# Patient Record
Sex: FEMALE | HISPANIC OR LATINO | URBAN - METROPOLITAN AREA
[De-identification: names, ages, dates, MRNs, and addresses within clinical notes are randomized per-mention and may not be internally consistent; named-entity substitution may affect disease eponyms.]

---

## 2017-01-12 ENCOUNTER — OFFICE VISIT (OUTPATIENT)
Dept: FAMILY MEDICINE | Facility: CLINIC | Age: 52
End: 2017-01-12

## 2017-01-12 VITALS
HEIGHT: 62 IN | BODY MASS INDEX: 26.46 KG/M2 | OXYGEN SATURATION: 99 % | HEART RATE: 74 BPM | WEIGHT: 143.8 LBS | DIASTOLIC BLOOD PRESSURE: 81 MMHG | SYSTOLIC BLOOD PRESSURE: 118 MMHG

## 2017-01-12 DIAGNOSIS — I10 ESSENTIAL HYPERTENSION WITH GOAL BLOOD PRESSURE LESS THAN 140/90: Primary | ICD-10-CM

## 2017-01-12 DIAGNOSIS — E11.9 TYPE 2 DIABETES MELLITUS WITHOUT COMPLICATION, WITHOUT LONG-TERM CURRENT USE OF INSULIN (H): ICD-10-CM

## 2017-01-12 RX ORDER — SIMVASTATIN 20 MG
20 TABLET ORAL AT BEDTIME
Qty: 30 TABLET | Refills: 4 | Status: SHIPPED | OUTPATIENT
Start: 2017-01-12

## 2017-01-12 RX ORDER — HYDROCHLOROTHIAZIDE 25 MG/1
25 TABLET ORAL DAILY
Qty: 30 TABLET | Refills: 4 | Status: SHIPPED | OUTPATIENT
Start: 2017-01-12

## 2017-01-12 RX ORDER — LOSARTAN POTASSIUM 25 MG/1
25 TABLET ORAL DAILY
Qty: 30 TABLET | Refills: 4 | Status: SHIPPED | OUTPATIENT
Start: 2017-01-12

## 2017-01-12 NOTE — Clinical Note
I have completed my note, please review, add tie in statement and close encounter.   Thanks!   Marco Sheppard Email: [email or web address]

## 2017-01-13 NOTE — PROGRESS NOTES
"[facility] Progress Note    Chief complaint: Medication Refill      Subjective:  Patient comes to the clinic to get her medications refilled.       Current Outpatient Prescriptions   Medication Sig Dispense Refill     aspirin 81 MG EC tablet Take 1 tablet (81 mg) by mouth daily 30 tablet 4     hydrochlorothiazide (HYDRODIURIL) 25 MG tablet Take 1 tablet (25 mg) by mouth daily 30 tablet 4     losartan (COZAAR) 25 MG tablet Take 1 tablet (25 mg) by mouth daily 30 tablet 4     metFORMIN (GLUCOPHAGE) 500 MG tablet Take 2 tablets (1,000 mg) by mouth 2 times daily (with meals) 120 tablet 4     simvastatin (ZOCOR) 20 MG tablet Take 1 tablet (20 mg) by mouth At Bedtime 30 tablet 4     IBUPROFEN PO Take 1 tablet by mouth as needed for moderate pain (Takes for shoulder pain but has not needed in a long time.)       [DISCONTINUED] hydrochlorothiazide (HYDRODIURIL) 25 MG tablet Take 1 tablet (25 mg) by mouth daily 30 tablet 5     [DISCONTINUED] losartan (COZAAR) 25 MG tablet Take 1 tablet (25 mg) by mouth daily 30 tablet 5     [DISCONTINUED] metFORMIN (GLUCOPHAGE) 500 MG tablet Take 2 tablets (1,000 mg) by mouth 2 times daily (with meals) 120 tablet 5     [DISCONTINUED] simvastatin (ZOCOR) 20 MG tablet Take 1 tablet (20 mg) by mouth At Bedtime 30 tablet 5     fish oil-omega-3 fatty acids 1000 MG capsule Take 1 g by mouth daily           Objective:   /81 mmHg  Pulse 74  Ht 5' 1.5\" (156.2 cm)  Wt 143 lb 12.8 oz (65.227 kg)  BMI 26.73 kg/m2  SpO2 99%    Assessment:     Condition 1- Type II Diabetes    Patient is controlling her diabetes very well. She is currently taking [id number] mg twice daily and denies any side effects such as stomach upset. Patient also denies any symptoms of hypoglycemia such as sweating or shacking. According to the ADA guidelines, her goal A1C level is less than 7%.Patient had her lab drawn last month on 12/19/2016 and her A1C level was 5.6%. Patient is at goal with her A1C levels. No " changes will be made and she will continue to take her current regimen.     Condition 2- Hypertension    Patient is currently taking losartan 25mg (1 tablet by mouth daily) and hydrochlorothiazide 25mg (1 tablet by mouth daily) to control her blood pressure. Patient's blood pressure at the time of visit was 118/81 mmHg. According to the JNC8 guidelines and ADA guidelines, the goal blood pressure for a patient with diabetes is < 140/90 mmHg. Her blood pressure at the time of visit is at goal. Patient denies any side effects from losartan and hydrochlorothiazide such as dizziness. No changes will be made and she will continue her current regimen.     Condition 3- Hyperlipidemia/cardiovascular health    Patient is also taking aspirin 81 mg (1 tablet by mouth daily) and simvastatin 20 mg (1 tablet by mouth before bedtime). Patient denies any side effects from aspirin such as easy bleeding or bruising. Patient also denies side effects from the simvastatin such as muscle pain. Her lab values from 12/19/2016 were: HDL 48, LDL 50, Total cholesterol 163. Patient's 10-year ASCVD risk is 3.3%. Per ACC/AHA guidelines, patient should be in moderate-intensity statin, which she is. She will continue with her current medication regimen.     Plan:  1. Continue taking aspirin 81 mg tablet - 1 tablet by mouth daily  2. Continue taking losartan 25 mg tablet - 1 tablet by mouth daily  3. Continue taking hydrochlorothiazide 25 mg tablet - 1 tablet by mouth daily  4. Continue taking metformin 500 mg tablet - take 2 tablets by mouth twice daily  5. Continue taking simvastatin 20 mg tablet - take 1 tablet by mouth before bedtime    Pharmacy Follow-Up Plan (Method, Date, Parameters):     Follow-up by phone within 7 days to assess the safety of her medications. For aspirin, assess whether patient is having nose bleed, if she has bruises on her body. For losartan, assess whether the patient is having dizziness after taking her medication,  difficulty breathing, or experiencing swelling of face or lips (Angioedema). For hydrochlorothiazide, assess whether the patient is having dizziness. For metformin, assess whether patient is experiencing any stomach upset after taking the medication. For simvastatin, ask whether she is experiencing any muscle pain.       PharmCare Clinician: Ellie Sheppard   Pharmacy Preceptor: Joaquín Myers    _____________________________  Preceptor Use Only:  In supervising the student, I have reviewed and verified the student's documentation and found it to be correct and complete.   Preceptor Signature: Joaquín Myers PharmD

## 2017-01-13 NOTE — PROGRESS NOTES
HPI      ROS      Physical Exam    CHW- 1/12/17 Patient has no need for CHW services tonight. ADH/BM

## 2017-01-13 NOTE — NURSING NOTE
Patient only wanted medication refill. She had no medical complaint.  She wants a refill for metformin, Losartan, hydrochlorothiazide, Aspirin and a cholesterol medication she could not recall.     Simon Hawthorne

## 2017-02-13 ENCOUNTER — OFFICE VISIT (OUTPATIENT)
Dept: FAMILY MEDICINE | Facility: CLINIC | Age: 52
End: 2017-02-13

## 2017-02-13 DIAGNOSIS — I10 ESSENTIAL HYPERTENSION: ICD-10-CM

## 2017-02-13 NOTE — MR AVS SNAPSHOT
After Visit Summary   2017    Laura Garcia    MRN: [id number]           Patient Information     Date Of Birth          1965        Visit Information        Provider Department      2017 7:30 PM Clinician, Dirk Martinez [facility]        Today's Diagnoses     Uncontrolled type 2 diabetes mellitus without complication, without long-term current use of insulin (H)    -  1    Essential hypertension           Follow-ups after your visit        Who to contact     Please call your clinic at [phone] to:    Ask questions about your health    Make or cancel appointments    Discuss your medicines    Learn about your test results    Speak to your doctor   If you have compliments or concerns about an experience at your clinic, or if you wish to file a complaint, please contact [facility] Patient Relations at [phone] or email us at [email or web address]         Additional Information About Your Visit        MyChart Information     Blinkiverse is an electronic gateway that provides easy, online access to your medical records. With Blinkiverse, you can request a clinic appointment, read your test results, renew a prescription or communicate with your care team.     To sign up for MarketBridget visit the website at [email or web address]   You will be asked to enter the access code listed below, as well as some personal information. Please follow the directions to create your username and password.     Your access code is: [email or web address]  Expires: 2017 11:15 PM     Your access code will  in 90 days. If you need help or a new code, please contact your [facility] or call [phone] for assistance.        Care EveryWhere ID     This is your Care EveryWhere ID. This could be used by other organizations to access your [place] medical records  [id number]         Blood Pressure from Last 3 Encounters:    01/12/17 118/81   12/19/16 125/90   06/16/16 120/80    Weight from Last 3 Encounters:   01/12/17 143 lb 12.8 oz (65.2 kg)   12/19/16 143 lb 8 oz (65.1 kg)   06/16/16 141 lb 6.4 oz (64.1 kg)              Today, you had the following     No orders found for display       Primary Care Provider    None       No address on file        Thank you!     Thank you for choosing [facility]  for your care. Our goal is always to provide you with excellent care. Hearing back from our patients is one way we can continue to improve our services. Please take a few minutes to complete the written survey that you may receive in the mail after your visit with us. Thank you!             Your Updated Medication List - Protect others around you: Learn how to safely use, store and throw away your medicines at www.disposemymeds.org.          This list is accurate as of: 2/13/17 11:59 PM.  Always use your most recent med list.                   Brand Name Dispense Instructions for use    aspirin 81 MG EC tablet     30 tablet    Take 1 tablet (81 mg) by mouth daily       fish oil-omega-3 fatty acids 1000 MG capsule      Take 1 g by mouth daily       hydrochlorothiazide 25 MG tablet    HYDRODIURIL    30 tablet    Take 1 tablet (25 mg) by mouth daily       IBUPROFEN PO      Take 1 tablet by mouth as needed for moderate pain (Takes for shoulder pain but has not needed in a long time.)       losartan 25 MG tablet    COZAAR    30 tablet    Take 1 tablet (25 mg) by mouth daily       metFORMIN 500 MG tablet    GLUCOPHAGE    120 tablet    Take 2 tablets (1,000 mg) by mouth 2 times daily (with meals)       simvastatin 20 MG tablet    ZOCOR    30 tablet    Take 1 tablet (20 mg) by mouth At Bedtime

## 2017-02-13 NOTE — Clinical Note
I have completed my note, please review, add tie in statement and close encounter.   Thanks!   [email or web address]

## 2017-02-14 NOTE — PROGRESS NOTES
Quick Fill Counseling Note  Patient: Laura Garcia  : 1965  QF or AF: QF    Did patient fill within expected time: Yes  Next expected fill date: 3/9/17    Student(s) that reviewed medications with patient: Stacy Castillo    Medication: Aspirin 81 mg  How is patient taking: One tablet by mouth daily  Effectiveness monitored: N/A  Reported Side effects: None  Any missed doses: None    Medication: Hydrochlorothiazide 25 mg  How is patient taking: One tablet by mouth daily  Effectiveness monitored: Yes. No symptoms of dizziness or fatigue  Reported Side effects: None  Any missed doses: None    Medication: Losartan 25 mg  How is patient taking: One tablet by mouth daily  Effectiveness monitored: Yes  Reported Side effects: None  Any missed doses: None    Medication: Metformin 500 mg tablet  How is patient taking: Two tablets by mouth two times daily  Effectiveness monitored: Yes  Reported Side effects: None  Any missed doses: None    Medication: Simvastatin 20 mg tablet  How is patient taking: One tablet by mouth daily  Effectiveness monitored: Not without labs  Reported Side effects: None  Any missed doses: None    In supervising the pharmacy student, I have reviewed and verified the pharmacy student s documentation.  Supervising Provider: Billy Jackson, Pharm.D.

## 2017-03-13 ENCOUNTER — OFFICE VISIT (OUTPATIENT)
Dept: FAMILY MEDICINE | Facility: CLINIC | Age: 52
End: 2017-03-13

## 2017-03-13 DIAGNOSIS — Z76.0 ENCOUNTER FOR MEDICATION REFILL: Primary | ICD-10-CM

## 2017-03-13 NOTE — MR AVS SNAPSHOT
After Visit Summary   3/13/2017    Laura Garcia    MRN: [id number]           Patient Information     Date Of Birth          1965        Visit Information        Provider Department      3/13/2017 8:45 PM Clinician, Dirk Crawford [facility]        Today's Diagnoses     Encounter for medication refill    -  1       Follow-ups after your visit        Who to contact     Please call your clinic at [phone] to:    Ask questions about your health    Make or cancel appointments    Discuss your medicines    Learn about your test results    Speak to your doctor   If you have compliments or concerns about an experience at your clinic, or if you wish to file a complaint, please contact [facility] Patient Relations at [phone] or email us at [email or web address]         Additional Information About Your Visit        MyChart Information     VizeraLabs is an electronic gateway that provides easy, online access to your medical records. With VizeraLabs, you can request a clinic appointment, read your test results, renew a prescription or communicate with your care team.     To sign up for LIQUITYt visit the website at [email or web address]   You will be asked to enter the access code listed below, as well as some personal information. Please follow the directions to create your username and password.     Your access code is: [email or web address]  Expires: 2017 11:15 PM     Your access code will  in 90 days. If you need help or a new code, please contact your [facility] or call [phone] for assistance.        Care EveryWhere ID     This is your Care EveryWhere ID. This could be used by other organizations to access your [place] medical records  [id number]         Blood Pressure from Last 3 Encounters:   No data found for BP    Weight from Last 3 Encounters:   No data found for Wt              Today, you had  the following     No orders found for display       Primary Care Provider    None       No address on file        Thank you!     Thank you for choosing [facility]  for your care. Our goal is always to provide you with excellent care. Hearing back from our patients is one way we can continue to improve our services. Please take a few minutes to complete the written survey that you may receive in the mail after your visit with us. Thank you!             Your Updated Medication List - Protect others around you: Learn how to safely use, store and throw away your medicines at www.disposemymeds.org.          This list is accurate as of: 3/13/17 11:59 PM.  Always use your most recent med list.                   Brand Name Dispense Instructions for use    aspirin 81 MG EC tablet     30 tablet    Take 1 tablet (81 mg) by mouth daily       fish oil-omega-3 fatty acids 1000 MG capsule      Take 1 g by mouth daily       hydrochlorothiazide 25 MG tablet    HYDRODIURIL    30 tablet    Take 1 tablet (25 mg) by mouth daily       IBUPROFEN PO      Take 1 tablet by mouth as needed for moderate pain (Takes for shoulder pain but has not needed in a long time.)       losartan 25 MG tablet    COZAAR    30 tablet    Take 1 tablet (25 mg) by mouth daily       metFORMIN 500 MG tablet    GLUCOPHAGE    120 tablet    Take 2 tablets (1,000 mg) by mouth 2 times daily (with meals)       simvastatin 20 MG tablet    ZOCOR    30 tablet    Take 1 tablet (20 mg) by mouth At Bedtime

## 2017-03-13 NOTE — Clinical Note
I have completed my note, please review, add tie in statement and close encounter.   Thanks!   Alec Apple [email or web address]

## 2017-03-14 NOTE — PROGRESS NOTES
Quick Fill Counseling Note  Patient: Laura Garcia  : 1965  QF or AF: QF    Did patient fill within expected time: Yes  Next expected fill date: 17    Student(s) that reviewed medications with patient: Alec    Medication: Aspirin 81 mg tablets  How is patient taking: Taking 1 tablet by mouth daily  Effectiveness monitored: Effective  Reported Side effects: None reported  Any missed doses: None    Quick Fill Counseling Note  Patient: Laura Garcia  : 1965  QF or AF: QF    Did patient fill within expected time: Yes  Next expected fill date: 17    Student(s) that reviewed medications with patient: Alec    Medication: Hydrochlorothiazide 25 mg   How is patient taking: Taking 1 tablet by mouth daily  Effectiveness monitored: Effective  Reported Side effects: None reported  Any missed doses: None    Quick Fill Counseling Note  Patient: Laura Garcia  : 1965  QF or AF: QF    Did patient fill within expected time: Yes  Next expected fill date: 17    Student(s) that reviewed medications with patient: Alec    Medication: Simvastatin 20 mg  How is patient taking: Taking 1 tablet by mouth at bednight  Effectiveness monitored: Effective  Reported Side effects: None reported  Any missed doses: None    Quick Fill Counseling Note  Patient: Laura Garcia  : 1965  QF or AF: QF    Did patient fill within expected time: Yes  Next expected fill date: 17    Student(s) that reviewed medications with patient: Alec    Medication: Losartan 25 mg  How is patient taking: Taking 1 tablet by mouth daily  Effectiveness monitored: Effective  Reported Side effects: None reported  Any missed doses: None    Quick Fill Counseling Note  Patient: Laura Garcia  : 1965  QF or AF: QF    Did patient fill within expected time: Yes  Next expected fill date: 17    Student(s) that reviewed medications with patient:  Alec    Medication: Metformin 500 mg   How is patient taking: Taking 2 tablets by mouth twice daily with meals  Effectiveness monitored: effective  Reported Side effects: None reported  Any missed doses: None          ----------------------------------------------------------    I was present for the entire encounter and agree with the assessment and plan presented.    Michael HareD.

## 2017-04-13 ENCOUNTER — OFFICE VISIT (OUTPATIENT)
Dept: FAMILY MEDICINE | Facility: CLINIC | Age: 52
End: 2017-04-13

## 2017-04-13 DIAGNOSIS — Z76.0 ENCOUNTER FOR MEDICATION REFILL: Primary | ICD-10-CM

## 2017-04-13 NOTE — MR AVS SNAPSHOT
After Visit Summary   2017    Laura Garcia    MRN: [id number]           Patient Information     Date Of Birth          1965        Visit Information        Provider Department      2017 8:00 PM Clinician, Dirk Martinez [facility]        Today's Diagnoses     Encounter for medication refill    -  1       Follow-ups after your visit        Who to contact     Please call your clinic at [phone] to:    Ask questions about your health    Make or cancel appointments    Discuss your medicines    Learn about your test results    Speak to your doctor   If you have compliments or concerns about an experience at your clinic, or if you wish to file a complaint, please contact [facility] Patient Relations at [phone] or email us at [email or web address]         Additional Information About Your Visit        MyChart Information     YelloYello is an electronic gateway that provides easy, online access to your medical records. With YelloYello, you can request a clinic appointment, read your test results, renew a prescription or communicate with your care team.     To sign up for Techlicioust visit the website at [email or web address]   You will be asked to enter the access code listed below, as well as some personal information. Please follow the directions to create your username and password.     Your access code is: [email or web address]  Expires: 2017  3:30 PM     Your access code will  in 90 days. If you need help or a new code, please contact your [facility] or call [phone] for assistance.        Care EveryWhere ID     This is your Care EveryWhere ID. This could be used by other organizations to access your [place] medical records  [id number]         Blood Pressure from Last 3 Encounters:   17 118/81   16 125/90   16 120/80    Weight from Last 3 Encounters:   17 143 lb  12.8 oz (65.2 kg)   12/19/16 143 lb 8 oz (65.1 kg)   06/16/16 141 lb 6.4 oz (64.1 kg)              Today, you had the following     No orders found for display       Primary Care Provider    None       No address on file        Equal Access to Services     LM NAVARRO : Hadii charles ku hadfantasmao Soomaali, waaxda luqadaha, qaybta kaalmada adeegyada, courtney shi gailmanuel arthur robert amaod hardin. So [facility] [phone].    ATENCIÓN: Si habla español, tiene a ocampo disposición servicios gratuitos de asistencia lingüística. Llame al [phone].    We comply with applicable federal civil rights laws and [place] laws. We do not discriminate on the basis of race, color, national origin, age, disability sex, sexual orientation or gender identity.            Thank you!     Thank you for choosing [facility]  for your care. Our goal is always to provide you with excellent care. Hearing back from our patients is one way we can continue to improve our services. Please take a few minutes to complete the written survey that you may receive in the mail after your visit with us. Thank you!             Your Updated Medication List - Protect others around you: Learn how to safely use, store and throw away your medicines at www.disposemymeds.org.          This list is accurate as of: 4/13/17 11:59 PM.  Always use your most recent med list.                   Brand Name Dispense Instructions for use Diagnosis    aspirin 81 MG EC tablet     30 tablet    Take 1 tablet (81 mg) by mouth daily    Essential hypertension with goal blood pressure less than 140/90, Type 2 diabetes mellitus without complication, without long-term current use of insulin (H)       fish oil-omega-3 fatty acids 1000 MG capsule      Take 1 g by mouth daily        hydrochlorothiazide 25 MG tablet    HYDRODIURIL    30 tablet    Take 1 tablet (25 mg) by mouth daily    Essential hypertension with goal blood pressure less than 140/90       IBUPROFEN PO      Take 1  tablet by mouth as needed for moderate pain (Takes for shoulder pain but has not needed in a long time.)        losartan 25 MG tablet    COZAAR    30 tablet    Take 1 tablet (25 mg) by mouth daily    Essential hypertension with goal blood pressure less than 140/90       metFORMIN 500 MG tablet    GLUCOPHAGE    120 tablet    Take 2 tablets (1,000 mg) by mouth 2 times daily (with meals)    Type 2 diabetes mellitus without complication, without long-term current use of insulin (H)       simvastatin 20 MG tablet    ZOCOR    30 tablet    Take 1 tablet (20 mg) by mouth At Bedtime    Type 2 diabetes mellitus without complication, without long-term current use of insulin (H)

## 2017-04-13 NOTE — Clinical Note
I have completed my note, please review, add tie in statement and close encounter.   Thanks!   Silvia Sheppard

## 2017-05-11 ENCOUNTER — OFFICE VISIT (OUTPATIENT)
Dept: FAMILY MEDICINE | Facility: CLINIC | Age: 52
End: 2017-05-11

## 2017-05-11 DIAGNOSIS — Z76.0 ENCOUNTER FOR MEDICATION REFILL: Primary | ICD-10-CM

## 2017-05-11 NOTE — MR AVS SNAPSHOT
After Visit Summary   2017    Laura Garcia    MRN: [id number]           Patient Information     Date Of Birth          1965        Visit Information        Provider Department      2017 6:30 PM Clinician, Dirk Crawford [facility]        Today's Diagnoses     Encounter for medication refill    -  1       Follow-ups after your visit        Who to contact     Please call your clinic at [phone] to:    Ask questions about your health    Make or cancel appointments    Discuss your medicines    Learn about your test results    Speak to your doctor   If you have compliments or concerns about an experience at your clinic, or if you wish to file a complaint, please contact [facility] Patient Relations at [phone] or email us at [email or web address]         Additional Information About Your Visit        MyChart Information     PlayFirst is an electronic gateway that provides easy, online access to your medical records. With PlayFirst, you can request a clinic appointment, read your test results, renew a prescription or communicate with your care team.     To sign up for Veevat visit the website at [email or web address]   You will be asked to enter the access code listed below, as well as some personal information. Please follow the directions to create your username and password.     Your access code is: [email or web address]  Expires: 2017 11:15 PM     Your access code will  in 90 days. If you need help or a new code, please contact your [facility] or call [phone] for assistance.        Care EveryWhere ID     This is your Care EveryWhere ID. This could be used by other organizations to access your [place] medical records  [id number]         Blood Pressure from Last 3 Encounters:   No data found for BP    Weight from Last 3 Encounters:   No data found for Wt              Today, you had  the following     No orders found for display       Primary Care Provider    None       No address on file        Thank you!     Thank you for choosing [facility]  for your care. Our goal is always to provide you with excellent care. Hearing back from our patients is one way we can continue to improve our services. Please take a few minutes to complete the written survey that you may receive in the mail after your visit with us. Thank you!             Your Updated Medication List - Protect others around you: Learn how to safely use, store and throw away your medicines at www.disposemymeds.org.          This list is accurate as of: 5/11/17 11:59 PM.  Always use your most recent med list.                   Brand Name Dispense Instructions for use    aspirin 81 MG EC tablet     30 tablet    Take 1 tablet (81 mg) by mouth daily       fish oil-omega-3 fatty acids 1000 MG capsule      Take 1 g by mouth daily       hydrochlorothiazide 25 MG tablet    HYDRODIURIL    30 tablet    Take 1 tablet (25 mg) by mouth daily       IBUPROFEN PO      Take 1 tablet by mouth as needed for moderate pain (Takes for shoulder pain but has not needed in a long time.)       losartan 25 MG tablet    COZAAR    30 tablet    Take 1 tablet (25 mg) by mouth daily       metFORMIN 500 MG tablet    GLUCOPHAGE    120 tablet    Take 2 tablets (1,000 mg) by mouth 2 times daily (with meals)       simvastatin 20 MG tablet    ZOCOR    30 tablet    Take 1 tablet (20 mg) by mouth At Bedtime

## 2017-05-11 NOTE — Clinical Note
I have completed my note, please review, add tie in statement and close encounter.   Thanks!   Meesun So [email or web address]

## 2017-05-11 NOTE — PROGRESS NOTES
Quick Fill Counseling Note  Patient: Laura Garcia  : 1965  QF or AF: QF    Did patient fill within expected time: yes (17)  Next expected fill date: 17    Student(s) that reviewed medications with patient: Meesun So and Yamile Dumont     Medication: aspirin 81mg / HCTZ 25mg / losartan 25mg / metformin 500mg / simvastatin 20mg   How is patient takin PO QD / 1 PO QD / 1 PO QD / 2 tabs PO BID / 1 PO QD HS - the patient adherent her medications well   Effectiveness monitored: management of blood pressure, blood sugar, and cholesterol   Reported Side effects: none   Any missed doses: none

## 2017-06-12 ENCOUNTER — OFFICE VISIT (OUTPATIENT)
Dept: FAMILY MEDICINE | Facility: CLINIC | Age: 52
End: 2017-06-12

## 2017-06-12 DIAGNOSIS — Z76.0 ENCOUNTER FOR MEDICATION REFILL: Primary | ICD-10-CM

## 2017-06-13 NOTE — PROGRESS NOTES
Quick Fill Counseling Note  Patient: Laura Garcia  : 1965  QF or AF: QF    Did patient fill within expected time: yes  Next expected fill date: n/a (out of refills)    Student(s) that reviewed medications with patient: Lias William    Medication: metformin 500 mg tablet  How is patient takin tablets orally daily with meals  Effectiveness monitored: yes  Reported Side effects: none  Any missed doses: 1 per week    Medication: simvastatin 20 mg tablet  How is patient takin tablet by mouth at bedtime  Effectiveness monitored: yes  Reported Side effects: none  Any missed doses: 1 per week    Medication: aspirin 81 mg EC  How is patient takin tablet by mouth daily  Effectiveness monitored: yes  Reported Side effects: none  Any missed doses: 1 per week    Medication: losartan 25 mg tablet  How is patient takin tablet by mouth daily  Effectiveness monitored: yes  Reported Side effects: none  Any missed doses: 1 per week    Medication: hydrochlorothiazide mg tablet  How is patient takin tablet by mouth daily  Effectiveness monitored: yes  Reported Side effects: none  Any missed doses: 1 per week

## 2018-03-30 ENCOUNTER — MEDICAL CORRESPONDENCE (OUTPATIENT)
Dept: HEALTH INFORMATION MANAGEMENT | Facility: CLINIC | Age: 53
End: 2018-03-30

## 2022-07-14 ENCOUNTER — OFFICE VISIT (OUTPATIENT)
Dept: FAMILY MEDICINE | Facility: CLINIC | Age: 57
End: 2022-07-14

## 2022-07-14 DIAGNOSIS — Z76.0 ENCOUNTER FOR MEDICATION REFILL: Primary | ICD-10-CM

## 2022-07-17 NOTE — PROGRESS NOTES
Quick Fill Counseling Note  Patient Full Name: Laura Liu  : 1965  Last date of full med visit: 22 (See patient's other profile MRN [id number] for documentation)    Did patient fill within expected time: yes  Number of refill left (after this fill): 3  Next expected fill date:22  Student(s) that reviewed medications with patient: Estuardo Delcid    Medication 1: Aspirin 81mg EC tablet  How is patient taking: Take 1 tablet (81mg) by mouth once daily  Effectiveness monitored: Patient stated they have been taking this medication for years and has had no problems.  Reported Side effects: None  Any missed doses: None    Medication 2: Hydrochlorothiazide 25mg tablet  How is patient taking: Take 1 tablet (25mg) by mouth once daily  Effectiveness monitored: Patient stated they have been taking this medication for years and has had no problems  Reported Side effects: none  Any missed doses: none    Medication 3: Losartan 25 mg tablet  How is patient taking: Take 1 tablet (25mg) by mouth once daily  Effectiveness monitored: Patient stated they have been taking this medication for years and has had no problems  Reported Side effects: none  Any missed doses: none    Medication 4: Metformin 500mg tablet  How is patient taking: Take half a tablet (250mg) in the morning and 1 tablet (500mg) in the evening with meals.  Effectiveness monitored: Patient stated they have been taking this medication for years and has had no problems  Reported Side effects: none  Any missed doses:none    Medication 5: Simvastatin 20mg tablet  How is patient taking: Take 1 tablet (20 mg) by mouth at bedtime  Effectiveness monitored: Patient stated they have been taking this medication for years and has had no problems  Reported Side effects: none  Any missed doses: none    Pharmacy Student: Estuardo Delcid  Pharmacy Preceptor: Cha Jennings    ________________  Preceptor Use Only:  In supervising the student, I have reviewed and  verified the student's documentation and found it to be correct and complete.  Preceptor Signature: Nereida TaD

## 2022-08-11 ENCOUNTER — OFFICE VISIT (OUTPATIENT)
Dept: FAMILY MEDICINE | Facility: CLINIC | Age: 57
End: 2022-08-11

## 2022-08-11 DIAGNOSIS — Z76.0 ENCOUNTER FOR MEDICATION REFILL: Primary | ICD-10-CM

## 2022-08-12 NOTE — PROGRESS NOTES
Quick Fill Counseling Note  Patient Full Name: Laura Liu  : 1965  Last date of full med visit: could not find    Did patient fill within expected time: yes  Number of refill left (after this fill): 0  Next expected fill date: 22    Student(s) that reviewed medications with patient: [place]    Medication 1: Hydrochlorothiazide 25 mg tab  How is patient taking: oral  Effectiveness monitored: yes  Reported Side effects: none  Any missed doses: none    Medication 2: Aspirin 81 mg chewable tablet  How is patient taking: oral  Effectiveness monitored: yes  Reported Side effects: none  Any missed doses: none    Medication 3: Atorvastatin 20 mg tablet  How is patient taking: oral  Effectiveness monitored: yes  Reported Side effects: none  Any missed doses: none    Medication 4: Metformin 1000 mg tablet  How is patient taking: oral  Effectiveness monitored: yes  Reported Side effects: none  Any missed doses: none    Medication 5: Losartan 25 mg tablet  How is patient taking: oral  Effectiveness monitored: yes  Reported Side effects: none  Any missed doses: none    Pharmacy Student: [place]  Pharmacy Preceptor: BUBBA    ________________  Preceptor Use Only:  In supervising the student, I have reviewed and verified the student's documentation and found it to be correct and complete.  Preceptor Signature: BUBBA

## 2022-09-08 ENCOUNTER — OFFICE VISIT (OUTPATIENT)
Dept: FAMILY MEDICINE | Facility: CLINIC | Age: 57
End: 2022-09-08

## 2022-09-08 VITALS
DIASTOLIC BLOOD PRESSURE: 57 MMHG | RESPIRATION RATE: 16 BRPM | TEMPERATURE: 98 F | OXYGEN SATURATION: 98 % | HEIGHT: 60 IN | BODY MASS INDEX: 27.68 KG/M2 | HEART RATE: 73 BPM | SYSTOLIC BLOOD PRESSURE: 105 MMHG | WEIGHT: 141 LBS

## 2022-09-08 DIAGNOSIS — I10 ESSENTIAL HYPERTENSION, BENIGN: ICD-10-CM

## 2022-09-08 DIAGNOSIS — Z00.00 HEALTH CARE MAINTENANCE: ICD-10-CM

## 2022-09-08 DIAGNOSIS — E11.9 TYPE 2 DIABETES MELLITUS WITHOUT COMPLICATION, WITHOUT LONG-TERM CURRENT USE OF INSULIN (H): Primary | ICD-10-CM

## 2022-09-08 DIAGNOSIS — E78.00 HYPERCHOLESTEREMIA: ICD-10-CM

## 2022-09-08 LAB
ANION GAP SERPL CALCULATED.3IONS-SCNC: 5 MMOL/L (ref 3–14)
BUN SERPL-MCNC: 18 MG/DL (ref 7–30)
CALCIUM SERPL-MCNC: 9.3 MG/DL (ref 8.5–10.1)
CHLORIDE BLD-SCNC: 107 MMOL/L (ref 94–109)
CHOLEST SERPL-MCNC: 184 MG/DL
CO2 SERPL-SCNC: 30 MMOL/L (ref 20–32)
CREAT SERPL-MCNC: 0.66 MG/DL (ref 0.52–1.04)
CREAT UR-MCNC: 142 MG/DL
FASTING STATUS PATIENT QL REPORTED: YES
GFR SERPL CREATININE-BSD FRML MDRD: >90 ML/MIN/1.73M2
GLUCOSE BLD-MCNC: 142 MG/DL (ref 70–99)
HBA1C MFR BLD: 6.9 % (ref 0–5.6)
HDLC SERPL-MCNC: 74 MG/DL
HOLD SPECIMEN: NORMAL
LDLC SERPL CALC-MCNC: 87 MG/DL
NONHDLC SERPL-MCNC: 110 MG/DL
POTASSIUM BLD-SCNC: 4.2 MMOL/L (ref 3.4–5.3)
PROT UR-MCNC: 0.11 G/L
PROT/CREAT 24H UR: 0.08 G/G CR (ref 0–0.2)
SODIUM SERPL-SCNC: 142 MMOL/L (ref 133–144)
TRIGL SERPL-MCNC: 117 MG/DL

## 2022-09-08 PROCEDURE — 87389 HIV-1 AG W/HIV-1&-2 AB AG IA: CPT

## 2022-09-08 PROCEDURE — 83036 HEMOGLOBIN GLYCOSYLATED A1C: CPT

## 2022-09-08 PROCEDURE — 84156 ASSAY OF PROTEIN URINE: CPT

## 2022-09-08 PROCEDURE — 80061 LIPID PANEL: CPT

## 2022-09-08 PROCEDURE — 82310 ASSAY OF CALCIUM: CPT

## 2022-09-08 RX ORDER — HYDROCHLOROTHIAZIDE 25 MG/1
TABLET ORAL
Qty: 30 TABLET | Refills: 3 | Status: SHIPPED | OUTPATIENT
Start: 2022-09-08 | End: 2023-09-21

## 2022-09-08 RX ORDER — SIMVASTATIN 10 MG
TABLET ORAL
Qty: 60 TABLET | Refills: 3 | Status: SHIPPED | OUTPATIENT
Start: 2022-09-08 | End: 2023-09-21

## 2022-09-08 RX ORDER — LOSARTAN POTASSIUM 25 MG/1
TABLET ORAL
Qty: 30 TABLET | Refills: 3 | Status: SHIPPED | OUTPATIENT
Start: 2022-09-08 | End: 2023-09-21

## 2022-09-08 NOTE — Clinical Note
I have completed my note, please review, add tie in statement and close encounter. Please let me know if you have any questions.  Email [email or web address] [phone]  Thanks!   Tarsha Sullivan

## 2022-09-08 NOTE — PROGRESS NOTES
[facility] Nursing Progress Note    Primary Concern: blood draw    Pt is requesting a routine blood draw and reports that it is done every 6 months. Pt reported coming here before and said she is usually given a fast pass to the pharmacy and lab. Pt had no other concerns. Meds were reviewed with the pt and are up to date.    Objective:  /57 (BP Location: Left arm, Patient Position: Sitting, Cuff Size: Adult Regular)   Pulse 73   Temp 98  F (36.7  C) (Temporal)   Resp 16   Ht 1.524 m (5')   Wt 64 kg (141 lb)   SpO2 98%   BMI 27.54 kg/m        Nursing Clinician: BENJA Swanson  Nursing Preceptor: Bhavani Lucero    _____________________________  Preceptor Use Only:  In supervising the student, I have reviewed and verified the student's documentation and found it to be correct and complete.   Preceptor Signature: Bhavani Lucero RN

## 2022-09-09 LAB — HIV 1+2 AB+HIV1 P24 AG SERPL QL IA: NONREACTIVE

## 2022-09-09 NOTE — PROGRESS NOTES
[facility] Pharmacy Progress Note    Chief complaint: Blood test    Last date of full med: Unknown    Subjective:  Laura (ELISEO) is a 56 years old female who is coming into the clinic for an A1c blood test. Her known conditions include hypertension, Type 2 Diabetes, and high cholesterol. She drinks 1 cup of coffee every morning. She does not use any tobacco, does not drink alcohol, and does not do any recreational drugs. She does not have any drug allergies. She has received her covid vaccines. She has no problems with her medications or taking her medications. She has never missed a dose.     Hemoglobin A1C   Date Value Ref Range Status   3/7/22 5.9     12/19/2016 5.6 4.3 - 6.0 % Final   05/19/2016 5.8 4.3 - 6.0 % Final   06/22/2015 5.9 4.3 - 6.0 % Final       Potassium  4 months ago: 4.1  5 months ago: 4.0    Creatinine:   4 months ago: 0.68  5 months ago: 0.81    Current Outpatient Medications   Medication Sig Dispense Refill     aspirin 81 MG EC tablet Take 1 tablet (81 mg) by mouth daily 30 tablet 4     hydrochlorothiazide (HYDRODIURIL) 25 MG tablet Take 1 tablet (25 mg) by mouth daily 30 tablet 4     losartan (COZAAR) 25 MG tablet Take 1 tablet (25 mg) by mouth daily 30 tablet 4     metFORMIN (GLUCOPHAGE) 500 MG tablet Take 2 tablets (1,000 mg) by mouth 2 times daily (with meals) 120 tablet 4     simvastatin (ZOCOR) 20 MG tablet Take 1 tablet (20 mg) by mouth At Bedtime 30 tablet 4     fish oil-omega-3 fatty acids 1000 MG capsule Take 1 g by mouth daily (Patient not taking: Reported on 9/8/2022)       IBUPROFEN PO Take 1 tablet by mouth as needed for moderate pain (Takes for shoulder pain but has not needed in a long time.) (Patient not taking: Reported on 9/8/2022)     PATIENT IS NO LONGER TAKING FISH OIL AND IBUPROFEN!    Objective:  /57 (BP Location: Left arm, Patient Position: Sitting, Cuff Size: Adult Regular)   Pulse 73   Temp 98  F (36.7  C) (Temporal)   Resp 16   Ht 1.524 m (5')   Wt 64 kg (141 lb)    SpO2 98%   BMI 27.54 kg/m        Assessment:     DTP: Safety-Adverse medication event  Rationale: ELISEO is no longer taking fish oil due to an increase in high cholesterol.        Plan  1. She will be taking a A1c test, lipid panel, HIV test, urinalysis, and BMP test.   2. Patient will plan to attend women's night and eye night. She will attend women's night to get a mammogram and pap smear. She will attend eye night to check her eyes because she has type 2 diabetes.  3. Continue taking aspirin 81 mg daily, hydrochlorothiazide 25 mg daily, losartan 25 mg daily, simvastatin 20mg daily, and metformin 1000mg twice daily with meals.  4. Re-evaluate her Metformin abased on today's blood work result    Follow-Up:  The medical team will follow-up by phone with patient with her bloodwork results.     Pharmacy Clinician: Tarsha Sullivan  Pharmacy Preceptor: Cha Jennings    _____________________________  Preceptor Use Only:  In supervising the student, I have reviewed and verified the student's documentation and found it to be correct and complete.   Preceptor Signature: Shaka Aggarwal, Pharm.D.

## 2022-09-09 NOTE — PATIENT INSTRUCTIONS
Resumen de la Visita    Nombre del Paciente: Laura Liu  MRN: [id number]    Fecha de la Thelma: 9/8/2022    Dianostico medico principal:   Diabetes tipo 2, Hipercolesterolemia    Estudios de Laboratorio: Hemoglobina A1c, Panel Metabólico Básico, Proteína Orina Ale[place]ia, Panel Reflejo de Lípidos, Combinación de Anticuerpos y Antígenos del VIH      Referencias e Instrucciones: El médico sugiere jonas colonoscopia a través de la [facility] (recursos adjuntos). El médico también sugiere asistir a la Noche de la Mouna en [facility] el 3/11/22 para recibir jonas prueba de Papanicolaou.    Seguimiento/Resultados:   El equipo médico hará un seguimiento por teléfono para analizar los resultados de los análisis de verito.      Medico: Du Jaimes MD

## 2022-09-10 ENCOUNTER — TELEPHONE (OUTPATIENT)
Dept: FAMILY MEDICINE | Facility: CLINIC | Age: 57
End: 2022-09-10

## 2022-09-10 NOTE — TELEPHONE ENCOUNTER
Patient was called by [facility] , Nuno Toussaint, at approximately 3:45pm on 9/10/2022 and informed of her laboratory results that were collected at [facility] on 9/08/2022.     Patient was informed that her laboratory results reflected adequate control of her DMII and cholesterol levels. Additionally, she was notified that her HIV antigen antibody combo came back negative.

## 2022-10-03 ENCOUNTER — OFFICE VISIT (OUTPATIENT)
Dept: FAMILY MEDICINE | Facility: CLINIC | Age: 57
End: 2022-10-03

## 2022-10-03 VITALS
HEART RATE: 70 BPM | TEMPERATURE: 97.8 F | OXYGEN SATURATION: 98 % | BODY MASS INDEX: 27.22 KG/M2 | RESPIRATION RATE: 16 BRPM | SYSTOLIC BLOOD PRESSURE: 118 MMHG | WEIGHT: 144.18 LBS | DIASTOLIC BLOOD PRESSURE: 75 MMHG | HEIGHT: 61 IN

## 2022-10-03 DIAGNOSIS — E11.9 TYPE 2 DIABETES MELLITUS WITHOUT COMPLICATION, WITHOUT LONG-TERM CURRENT USE OF INSULIN (H): ICD-10-CM

## 2022-10-03 DIAGNOSIS — E78.00 HYPERCHOLESTEREMIA: Primary | ICD-10-CM

## 2022-10-03 DIAGNOSIS — I10 ESSENTIAL HYPERTENSION, BENIGN: ICD-10-CM

## 2022-10-03 DIAGNOSIS — Z00.00 HEALTH CARE MAINTENANCE: ICD-10-CM

## 2022-10-03 RX ORDER — HYDROCHLOROTHIAZIDE 25 MG/1
25 TABLET ORAL DAILY
Qty: 30 TABLET | Refills: 5 | Status: SHIPPED | OUTPATIENT
Start: 2022-10-03 | End: 2022-11-02

## 2022-10-03 RX ORDER — SIMVASTATIN 10 MG
20 TABLET ORAL AT BEDTIME
Qty: 60 TABLET | Refills: 5 | Status: SHIPPED | OUTPATIENT
Start: 2022-10-03 | End: 2022-11-02

## 2022-10-03 RX ORDER — LOSARTAN POTASSIUM 25 MG/1
25 TABLET ORAL DAILY
Qty: 30 TABLET | Refills: 5 | Status: SHIPPED | OUTPATIENT
Start: 2022-10-03 | End: 2022-11-02

## 2022-10-04 NOTE — PROGRESS NOTES
"[facility] Nursing Progress Note    Primary Concern: Patient is here to get all her medications refilled. Patient reports \"good health\" and states she got a full work-up last month with all results within normal range.       Objective:  /75 (BP Location: Left arm)   Pulse 70   Temp 97.8  F (36.6  C)   Resp 16   Ht 1.545 m (5' 0.83\")   Wt 65.4 kg (144 lb 2.9 oz)   SpO2 98%   BMI 27.40 kg/m        Social History:  Occupation: Patient is not currently working or volunteering  Physical Activity/Lifestyle: Patient reports walking 6-7 hours per day    PHQ Score:    PHQ2: 0    PHQ9: N/A    Nutrition Screener:    Q1 Answer: Never true    Q2 Answer: Never true    Referral to Nutrition needed?: No      Nursing Clinician: Amanuel Live  Nursing Preceptor: Bhavani Lucero RN    _____________________________  Preceptor Use Only:  In supervising the student, I have reviewed and verified the student's documentation and found it to be correct and complete.   Preceptor Signature: Bhavani Lucero  "

## 2022-10-04 NOTE — PROGRESS NOTES
[facility] Pharmacy Progress Note  Chief complaint: Patient presents to clinic interested in refilling her medications. She is here for a med refill visit with no new concerns.    Last date of full med:  09/08/2022    Subjective:   Laura is a 56 year old female with a history of hypertension, hypercholesterolemia, and diabetes mellitus. She was a previous smoker, but quit over 30 years ago. She denies caffeine use, alcohol use, and other recreational drug use. She has been walking for about 6-7 hours per day for exercise, and endorses a healthy diet with fruits and vegetables. She has been having trouble sleeping lately (waking up with concerns about her daughter) but denies any problems from lack of sleep and is not looking for assistance at this time. She has been adherent to all her medications and denies side effects such as dizziness, feeling faint, GI upset, bloody stools, or muscle pains.    Current Outpatient Medications   Medication Sig Dispense Refill    aspirin (ASA) 81 MG EC tablet Take 1 tablet (81 mg) by mouth daily for 30 days 30 tablet 5    hydrochlorothiazide (HYDRODIURIL) 25 MG tablet Take 1 tablet (25 mg) by mouth daily for 30 days 30 tablet 5    losartan (COZAAR) 25 MG tablet Take 1 tablet (25 mg) by mouth daily for 30 days 30 tablet 5    metFORMIN (GLUCOPHAGE) 1000 MG tablet Take 1 tablet (1,000 mg) by mouth 2 times daily (with meals) for 30 days 60 tablet 5    simvastatin (ZOCOR) 10 MG tablet Take 2 tablets (20 mg) by mouth At Bedtime for 30 days 60 tablet 5    aspirin (ASA) 81 MG EC tablet Yvonne jonas pastilla por boca jonas vez por parish 30 tablet 3    aspirin 81 MG EC tablet Take 1 tablet (81 mg) by mouth daily 30 tablet 4    fish oil-omega-3 fatty acids 1000 MG capsule Take 1 g by mouth daily (Patient not taking: Reported on 9/8/2022)      hydrochlorothiazide (HYDRODIURIL) 25 MG tablet Yvonne jonas pastilla por boca jonas vez por parish 30 tablet 3    hydrochlorothiazide (HYDRODIURIL) 25 MG tablet Take 1 tablet  "(25 mg) by mouth daily 30 tablet 4    IBUPROFEN PO Take 1 tablet by mouth as needed for moderate pain (Takes for shoulder pain but has not needed in a long time.) (Patient not taking: Reported on 9/8/2022)      losartan (COZAAR) 25 MG tablet Yvonne jonas pastilla por boca jonas vez por parish 30 tablet 3    losartan (COZAAR) 25 MG tablet Take 1 tablet (25 mg) by mouth daily 30 tablet 4    metFORMIN (GLUCOPHAGE) 1000 MG tablet Yvonne jonas pastilla por boca en la manana con comida  Yvonne jonas pastilla por boca en la tarde con comida 60 tablet 3    metFORMIN (GLUCOPHAGE) 500 MG tablet Take 2 tablets (1,000 mg) by mouth 2 times daily (with meals) 120 tablet 4    simvastatin (ZOCOR) 10 MG tablet Yvonne dos pastillas por boca en la noche antes de que dormirse 60 tablet 3    simvastatin (ZOCOR) 20 MG tablet Take 1 tablet (20 mg) by mouth At Bedtime 30 tablet 4         Objective:  /75 (BP Location: Left arm)   Pulse 70   Temp 97.8  F (36.6  C)   Resp 16   Ht 1.545 m (5' 0.83\")   Wt 65.4 kg (144 lb 2.9 oz)   SpO2 98%   BMI 27.40 kg/m      Assessment:     Hypertension: Controlled   DTP: None   Rationale: The patient is currently taking losartan 25 mg and hydrochlorothiazide 25 mg once daily for hypertension. Her blood pressure of 118/75 mmHg is at goal of < 130/80 mmHg per AHA 2017 hypertension guidelines. As the patient is currently well controlled, no changes to her medications for hypertension at this time.     Hypercholesterolemia: Controlled    DTP: None   Rationale: Patient's most recent LDL (9/8/22) was 87 mg/dL. Her ASCVD risk is currently low with a 10 year ASCVD risk of 3.2%. As she is currently stable on this regimen, no changes are indicated at this time.     Diabetes Mellitus: Controlled   DTP: none  Rationale: Most recent A1C was 6.9% (9/8/22) which is at goal of <7%. She also endorses exercise and a healthy diet to further aid in her diabetes control. No changes recommended at this time.    General Health: " Controlled   DTP: none  Rationale: Patient is currently taking ASA 81 mg once daily for primary prevention (and has been for years). She is currently at a low ASCVD risk (3.2%) so I discussed possible discontinuation of aspirin therapy (removing ASA does not increase her risk). The patient was not interested in discontinuing at this time, but I discussed possible risks with her and she is open to further discussion in the future (especially after age 60 when risks sometimes outweigh benefits for primary prevention). Patient is not currently experiencing side effects to ASA therapy and is adherent to the medication. She is exercising and eating a balanced diet.      Plan:  Continue aspirin 81 mg once daily, hydrochlorothiazide 25 mg once daily, losartan 25 mg once daily, simvastatin 20 mg once daily and metformin 1000 mg twice daily. Monitor for side effects (muscle pain, upset stomach, dizziness, bloody stools, etc.).  Continue to incorporate fruits and vegetables into diet and keep incorporating lots of walking for exercise.    to see us if interested in assistance for improving sleep.   Initiate QuickFill (as patient is currently stable and adherent).   Come back to clinic for QuickFill of medications in 1 month. Come back sooner if any questions or new concerns are present.    Follow-Up:  Come back to clinic in one month for refill of medications. Come back sooner if any questions or concerns are present. Follow up for full med within 1 year (most recent full med 9/8/2022).     Pharmacy Clinician: Kenna Mayne     ===============    I was present with the pharmacy student who participated in the service and in the documentation of this note. I have verified the history, personally performed the medical decision making, and have verified the content of the note, which accurately reflects my assessment of the patient and the plan of care.    Shaka Aggarwal, Pharm.D.      =================

## 2022-11-04 PROBLEM — Z76.0 ENCOUNTER FOR MEDICATION REFILL: Status: ACTIVE | Noted: 2022-11-04

## 2022-11-04 PROBLEM — Z76.0 ENCOUNTER FOR MEDICATION REFILL: Status: RESOLVED | Noted: 2022-11-04 | Resolved: 2022-11-04

## 2023-01-05 ENCOUNTER — OFFICE VISIT (OUTPATIENT)
Dept: FAMILY MEDICINE | Facility: CLINIC | Age: 58
End: 2023-01-05

## 2023-01-05 DIAGNOSIS — I10 ESSENTIAL HYPERTENSION: Primary | ICD-10-CM

## 2023-01-06 NOTE — PROGRESS NOTES
Quick Fill Counseling Note  Patient Full Name: Laura Liu  : 1965  Last date of full med visit: 22    Did patient fill within expected time: YES  Number of refill left (after this fill): 2  Next expected fill date: 23    Student(s) that reviewed medications with patient: KAIN LU    Medication 1: SIMVASTATIN 10 MG TAB  How is patient taking: TK 2 TS PO HS   Effectiveness monitored: PATIENT BELIEVES MEDICATION IS EFFECTIVE   Reported Side effects: NONE  Any missed doses: NONE    Medication 2: HYDROCHLOROTHIAZIDE 25 MG TAB  How is patient taking: TK 1 T PO D   Effectiveness monitored: PATIENT BELIEVES MEDICATION IS EFFECTIVE   Reported Side effects: NONE  Any missed doses: NONE    Medication 3: METFORMIN 1000MG TAB   How is patient taking: TK 1 T PO BID  Effectiveness monitored:PATIENT BELIEVES MEDICATION IS EFFECTIVE   Reported Side effects: NONE  Any missed doses: NONE    Medication 4: LOSARTAN 25 MG TAB  How is patient taking: TK 1 T PO D  Effectiveness monitored:PATIENT BELIEVES MEDICATION IS EFFECTIVE   Reported Side effects: NONE  Any missed doses: NONE    Medication 5: ASPIRIN 81 MG EC TAB  How is patient taking: TK 1 T PO D  Effectiveness monitored: PATIENT BELIEVES MEDICATION IS EFFECTIVE   Reported Side effects: NONE  Any missed doses: NONE    Pharmacy Student: KAIN LU  Pharmacy Preceptor: LAISHA JIMENEZ    ________________  Preceptor Use Only:  In supervising the student, I have reviewed and verified the student's documentation and found it to be correct and complete.  Preceptor Signature:

## 2023-02-02 ENCOUNTER — APPOINTMENT (OUTPATIENT)
Dept: FAMILY MEDICINE | Facility: CLINIC | Age: 58
End: 2023-02-02

## 2023-03-02 ENCOUNTER — OFFICE VISIT (OUTPATIENT)
Dept: FAMILY MEDICINE | Facility: CLINIC | Age: 58
End: 2023-03-02

## 2023-03-02 DIAGNOSIS — E11.65 TYPE 2 DIABETES MELLITUS WITH HYPERGLYCEMIA, WITHOUT LONG-TERM CURRENT USE OF INSULIN (H): Primary | ICD-10-CM

## 2023-03-03 NOTE — PROGRESS NOTES
Quick Fill Counseling Note  Patient Full Name: Laura Liu  : 1965  Last date of full med visit: 22    Did patient fill within expected time: yes   Number of refill left (after this fill): 0  Next expected fill date: Needs med refill visit    Student(s) that reviewed medications with patient: The pharmacist Mackenzie spoke with Laura.    Medication 1: SIMVASTATIN 10 MG TAB  How is patient taking: TK 2 TS PO HS   Effectiveness monitored: PATIENT BELIEVES MEDICATION IS EFFECTIVE   Reported Side effects: NONE  Any missed doses: NONE     Medication 2: HYDROCHLOROTHIAZIDE 25 MG TAB  How is patient taking: TK 1 T PO D   Effectiveness monitored: PATIENT BELIEVES MEDICATION IS EFFECTIVE   Reported Side effects: NONE  Any missed doses: NONE     Medication 3: METFORMIN 1000MG TAB   How is patient taking: TK 1 T PO BID  Effectiveness monitored:PATIENT BELIEVES MEDICATION IS EFFECTIVE   Reported Side effects: NONE  Any missed doses: NONE     Medication 4: LOSARTAN 25 MG TAB  How is patient taking: TK 1 T PO D  Effectiveness monitored:PATIENT BELIEVES MEDICATION IS EFFECTIVE   Reported Side effects: NONE  Any missed doses: NONE     Medication 5: ASPIRIN 81 MG EC TAB  How is patient taking: TK 1 T PO D  Effectiveness monitored: PATIENT BELIEVES MEDICATION IS EFFECTIVE   Reported Side effects: NONE  Any missed doses: NONE    Pharmacy Student: Venkatesh Owusu  Pharmacy Preceptor: Mackenzie Saleh    ________________  Preceptor Use Only:  In supervising the student, I have reviewed and verified the student's documentation and found it to be correct and complete.  Preceptor Signature:

## 2023-06-29 ENCOUNTER — OFFICE VISIT (OUTPATIENT)
Dept: FAMILY MEDICINE | Facility: CLINIC | Age: 58
End: 2023-06-29

## 2023-06-29 DIAGNOSIS — E11.65 TYPE 2 DIABETES MELLITUS WITH HYPERGLYCEMIA, WITHOUT LONG-TERM CURRENT USE OF INSULIN (H): Primary | ICD-10-CM

## 2023-06-29 NOTE — PROGRESS NOTES
Quick Fill Counseling Note  Patient: Laura Liu  : 1965  QF or AF: QF    Did patient fill within expected time: Yes  Next expected fill date: 23    Student(s) that reviewed medications with patient: Danielle Mckeon    Medication: Aspirin 81 mg tablet (Chewable tablet)  How is patient taking: Take 1 tablet by mouth daily.  Effectiveness monitored: Yes  Reported Side effects: None  Any missed doses: None    Medication: Hydrochlorothiazide 25 mg tablet  How is patient taking: Take 1 tablet by mouth daily.  Effectiveness monitored: Yes  Reported Side effects: None  Any missed doses: None    Medication: Metformin 1000 mg tablet  How is patient taking: Take 1 tablet by mouth daily (with breakfast) AND 1 tablet daily (with dinner).  Effectiveness monitored: Yes  Reported Side effects: None  Any missed doses: None    Medication: Losartan 25 mg tablet  How is patient taking: Take 1 tablet by mouth daily.  Effectiveness monitored: Yes  Reported Side effects: None  Any missed doses: None    Medication: Atorvastatin 20 mg tablet  How is patient taking: Take 2 tablets by mouth daily.  Effectiveness monitored: Yes  Reported Side effects: None  Any missed doses: None

## 2023-09-21 ENCOUNTER — OFFICE VISIT (OUTPATIENT)
Dept: FAMILY MEDICINE | Facility: CLINIC | Age: 58
End: 2023-09-21

## 2023-09-21 VITALS
WEIGHT: 136.8 LBS | SYSTOLIC BLOOD PRESSURE: 128 MMHG | OXYGEN SATURATION: 98 % | TEMPERATURE: 96.2 F | BODY MASS INDEX: 25.17 KG/M2 | HEART RATE: 73 BPM | RESPIRATION RATE: 16 BRPM | DIASTOLIC BLOOD PRESSURE: 84 MMHG | HEIGHT: 62 IN

## 2023-09-21 DIAGNOSIS — Z00.00 HEALTH CARE MAINTENANCE: ICD-10-CM

## 2023-09-21 DIAGNOSIS — E11.9 TYPE 2 DIABETES MELLITUS WITHOUT COMPLICATION, WITHOUT LONG-TERM CURRENT USE OF INSULIN (H): ICD-10-CM

## 2023-09-21 DIAGNOSIS — E78.00 HYPERCHOLESTEREMIA: ICD-10-CM

## 2023-09-21 DIAGNOSIS — I10 ESSENTIAL HYPERTENSION, BENIGN: ICD-10-CM

## 2023-09-21 DIAGNOSIS — Z76.0 ENCOUNTER FOR MEDICATION REFILL: Primary | ICD-10-CM

## 2023-09-21 LAB — HBA1C MFR BLD: 6.1 %

## 2023-09-21 PROCEDURE — 83036 HEMOGLOBIN GLYCOSYLATED A1C: CPT

## 2023-09-21 PROCEDURE — 82570 ASSAY OF URINE CREATININE: CPT

## 2023-09-21 PROCEDURE — 80048 BASIC METABOLIC PNL TOTAL CA: CPT

## 2023-09-21 RX ORDER — HYDROCHLOROTHIAZIDE 25 MG/1
TABLET ORAL
Qty: 30 TABLET | Refills: 3 | Status: SHIPPED | OUTPATIENT
Start: 2023-09-21

## 2023-09-21 RX ORDER — SIMVASTATIN 10 MG
TABLET ORAL
Qty: 60 TABLET | Refills: 3 | Status: SHIPPED | OUTPATIENT
Start: 2023-09-21

## 2023-09-21 RX ORDER — LOSARTAN POTASSIUM 25 MG/1
TABLET ORAL
Qty: 30 TABLET | Refills: 3 | Status: SHIPPED | OUTPATIENT
Start: 2023-09-21

## 2023-09-21 RX ORDER — ASPIRIN 81 MG/1
81 TABLET ORAL DAILY
Qty: 30 TABLET | Refills: 3 | Status: SHIPPED | OUTPATIENT
Start: 2023-09-21

## 2023-09-21 NOTE — Clinical Note
I have completed my note, please review, add tie in statement and close encounter.   Thanks!   Gricelda Bunn

## 2023-09-22 PROBLEM — Z76.0 ENCOUNTER FOR MEDICATION REFILL: Status: ACTIVE | Noted: 2023-09-22

## 2023-09-22 LAB
ANION GAP SERPL CALCULATED.3IONS-SCNC: 14 MMOL/L (ref 7–15)
BUN SERPL-MCNC: 15.2 MG/DL (ref 8–23)
CALCIUM SERPL-MCNC: 9.6 MG/DL (ref 8.6–10)
CHLORIDE SERPL-SCNC: 101 MMOL/L (ref 98–107)
CREAT SERPL-MCNC: 0.61 MG/DL (ref 0.51–0.95)
CREAT UR-MCNC: 122 MG/DL
DEPRECATED HCO3 PLAS-SCNC: 27 MMOL/L (ref 22–29)
EGFRCR SERPLBLD CKD-EPI 2021: >90 ML/MIN/1.73M2
GLUCOSE SERPL-MCNC: 110 MG/DL (ref 70–99)
HOLD SPECIMEN: NORMAL
MICROALBUMIN UR-MCNC: <12 MG/L
MICROALBUMIN/CREAT UR: NORMAL MG/G{CREAT}
POTASSIUM SERPL-SCNC: 3.9 MMOL/L (ref 3.4–5.3)
SODIUM SERPL-SCNC: 142 MMOL/L (ref 136–145)

## 2023-09-22 NOTE — PROGRESS NOTES
MEDICINE NOTE    SUBJECTIVE:  Laura Roach is a 57 year old female who presents to [facility] today for a medication refill. She was last seen at [facility] on 6/29/2023 and has ran out of her medication. Her last labs were drawn on 9/8/2022 and her HbA1C at that time was 6.9. Her medical history includes type II diabetes, hypertension, and hyperlipidemia. She has no current medical complaints and has had no changes to her health status in the past year. On review of systems she denies excessive thirst, unintentional weight gain or loss, hearing changes, vision changes, palpitations, chest pain, urinary frequency, dizziness/fainting, and numbness/tingling.     REVIEW OF SYSTEMS:  Gen: no fevers, night sweats or weight change  Eyes: no vision change, diplopia or red eyes  Ears, Noses, Mouth, Throat: no ringing in ears or hearing change  Cardiac: no chest pain, palpitations,   Lungs: no dyspnea, cough  GI: no nausea, vomiting, diarrhea or constipation, no abdominal pain  : no change in urine  Neuro: no numbness or tingling.     Past Medical History:   Diagnosis Date     Diabetes mellitus, type 2 (H)      Hypercholesteremia      Primary hypertension        No past surgical history on file.    No family history on file.    Social History     Socioeconomic History     Marital status:      Spouse name: Not on file     Number of children: Not on file     Years of education: Not on file     Highest education level: Not on file   Occupational History     Not on file   Tobacco Use     Smoking status: Former     Packs/day: 0.05     Years: 33.00     Pack years: 1.65     Types: Cigarettes     Smokeless tobacco: Not on file   Substance and Sexual Activity     Alcohol use: No     Drug use: No     Sexual activity: Not Currently     Partners: Male     Birth control/protection: Surgical   Other Topics Concern     Parent/sibling w/ CABG, MI or angioplasty before 65F 55M? Not Asked   Social History  Narrative    Date of Service:10/8/2012     Name: Laura MASTERS (Month, Day, Year of birth): 1965     MRN: [id number]     New Patient: No    Preferred Language: Slovenian     Needed: Yes    County of Residence:     Marital Status: Single    Household size:     Number of Dependents:     Pregnant:     Average Monthly Income: $     Citizenship Status:          Already applied for [employer], hasn't heard back.        Date of Service:2012     Name: Laura MASTERS (Month, Day, Year of birth): 1965     MRN: [id number]     New Patient: No    Preferred Language: Slovenian     Needed: Yes    County of Residence: [place]    Marital Status: Single    Household size:     Number of Dependents:     Pregnant: No    Average Monthly Income: $     Citizenship Status: No, not a resident        12 Already applied for [employer] Access, hasn't heard back.        Date of Service:12/10/2012     Name: Laura MASTERS (Month, Day, Year of birth): 1965     MRN: [id number]     New Patient: No    Preferred Language: Slovenian     Needed: Yes    County of Residence: [place]    Marital Status: Single    Household size: 5    Number of Dependents: 0    Pregnant: No    Average Monthly Income: $ 1400    Citizenship Status: no        12/10/12 Still waiting to hear back from [employer].    2013 patient turned in another AA application    13 patient was given another AA application and is planning to turn it in today.  CX    9/17/15 Patient did not want to talk to CHW at this time. CH        Date of Service:2013     Name: Laura MASTERS (Month, Day, Year of birth): 1965     MRN: [id number]     New Patient: No    Preferred Language: Slovenian     Needed: Yes    County of Residence: [place]    Marital Status: Single    Household size: 5    Number of Dependents:0    Pregnant:No    Average  Monthly Income: $1400    Citizenship Status: Non- [place] Citizen    9/30/13: Still waiting to hear back from AA about the status of her application. Gave her the number to call to check her status.        10/28/13: Asked the patient about her AA status. She said that she sent it in about a month ago but has not heard back. Recommended to call AA to update on the status of her application. She has the number. No other information provided. EL        11/25/13: She still has not heard back from AA. I gave her the number again for her to try calling and ask about her application. SSJ        1/8/14:    That patient comes in today reporting that she has not heard back from [employer] for 3 months.  She has tried to apply 2x now, and both times when she called in, she said that the program had not received her application.  They believe it was lost in the mail.  I talked to the patient about the prospect of applying to [employer] Access again, and she really is not interested.  The patient reports that she is satisfied with [facility]'s care, and that she has no other questions or concerns at this time.  JTO        1/29/14    Patient said she still hasn't heard back from [employer] yet, Gave her another application with clinics and she is going to fill it out and send it in. EES        2/24/14: Patient said she lost her Assured Access application; I supplied her with another. Please follow up at next visit to see if she was able to fill it out. AMR        3/24/14: Patient has not filled out an Assured Access application yet.  I gave her one to fill out here at the clinic.  She returned it back to me completed.  The application will be given to the [facility] coordinator so that it can get faxed out.  XX        4/23/14    Patient has not heard back from AA. Said that she would just continue to wait. TG        6/25/2014:    Patient said that she has contacted AA and they have told her that they have sent her card. She has not received  the card so far. I advised the patient to call AA again to check the status of her card since it has been 3 months since she was told that her card is on its way.    MENDOZA        8/28/2014: Patient was on her way out and only wanted to hear about mental health resources: directed her to [facility] and [facility] which both provide sliding scale mental health care for the  community.         12/22/14: Patient has applied to [employer] but had not heard back. We filled out a [facility] Healthnet sheet to get her some assistance with signing up for health insurance since she doesn't have any. She wanted a referral to a clinic for a women's health visit. We referred her to [facility] and [facility].    CH        1/22/15:    Patient didn't want to talk to CHW or Law student. Said that insurance application didn't go through and that she didn't want to bother trying tonight.        5/21/15:    Didn't want to talk to CHW about insurance.        7/17/2015 Patient did not want to talk to a CHW today. Stating she is just here to get her medication. -KJM        11/19/15    Patient talked to us, mentioned she had not gotten anything from AA. Attempted to give information on [employer], but did not seem interested. - AM         2/18/16 Spoke with , she is not need in CHW services at this time. States she believes her AA application is still being processed. JT         3/17/16: Patient requested  but none available. TB/BBM        4/18/16: Patient has no need for CHW services today. NH        5/19/16: Referred the patient to [facility] to get an X-ray. Provided the patient with information in [place]raini regarding number to call, what to bring to visit, address, etc. Also gave a PDF with images and brief notes for kegel exercises that the  relayed to her. MM         6/16/16: Patient did not have any needs or concerns for CHW today. JM        7/14/16: Patient had no need for CHW services tonight. NH      "   10/17/16 Patient has no need for CHW services tonight. JT        1/12/17 Patient has no need for CHW services tonight. ADH/BM        2/13/17 Patient has no need for CHW -PE and NS        6/12/2017- Quick fill,Patient left before CHW could speak with them.        9/8/2022- Provided patient with contact information for [facility] in order to schedule a colonoscopy.         9/21/2023: Patient did not request CHW services. MM     Social Determinants of Health     Financial Resource Strain: Not on file   Food Insecurity: Not on file   Transportation Needs: Not on file   Physical Activity: Not on file   Stress: Not on file   Social Connections: Not on file   Interpersonal Safety: Not on file   Housing Stability: Not on file       OBJECTIVE:  Physical Exam:  /84 (BP Location: Right arm, Patient Position: Sitting, Cuff Size: Adult Regular)   Pulse 73   Temp (!) 96.2  F (35.7  C) (Temporal)   Resp 16   Ht 1.575 m (5' 2\")   Wt 62.1 kg (136 lb 12.8 oz)   SpO2 98%   BMI 25.02 kg/m    Constitutional: no distress, comfortable, pleasant   Eyes: anicteric, normal extra-ocular movements   Cardiovascular: DP/PT pulses intact bilaterally, hair present on bilateral lower extremities.   Gastrointestinal: positive bowel sounds, nontender, no hepatosplenomegaly, no masses   Musculoskeletal: full range of motion, no edema   Skin: no concerning lesions, no jaundice   Psychological: appropriate mood       ASSESSMENT/PLAN:   This patient presented with a known history of hypertension, hyperlipidemia, and type II diabetes requesting a medication refill and annual labs. She has no medical concerns at this time and has had no changes in the last year including no headaches, no weight loss or gain, no vision changes, no chest pain or palpitations, no urinary changes, no numbness, tingling, dizziness, or LOC. She was evaluated at [facility] and had an unremarkable physical exam. [facility] glucose [facility], basic metabolic panel, HbA1C, and urine micro albumin. We refilled her metformin, hydrochlorothiazide, losartan, and simvastatin. We will follow up with her once her labs are resulted and she will plan to follow up with [facility] for continued regular care.     Diagnoses and all orders for this visit:    Encounter for medication refill    Health care maintenance  -     aspirin 81 MG EC tablet; Take 1 tablet (81 mg) by mouth daily Yvonne jonas pastilla por boca jonas vez por [place]    Essential hypertension, benign  -     hydrochlorothiazide (HYDRODIURIL) 25 MG tablet; Yvonne jonas pastilla por boca jonas vez por parish  -     losartan (COZAAR) 25 MG tablet; Yvonne jonas pastilla por boca jonas vez por parish    Type 2 diabetes mellitus without complication, without long-term current use of insulin (H)  -     Glucose by meter  -     Hemoglobin A1c; Future  -     Basic metabolic panel; Future  -     Albumin Random Urine Quantitative with Creat Ratio; Future  -     metFORMIN (GLUCOPHAGE) 1000 MG tablet; Yvonne jonas pastilla por boca en la manana con comida  Yvonne jonas pastilla por boca en la tarde con comida  -     Basic metabolic panel  -     Albumin Random Urine Quantitative with Creat Ratio  -     Hemoglobin A1c    Hypercholesteremia  -     simvastatin (ZOCOR) 10 MG tablet; Yvonne dos pastillas por boca en la noche antes de que dormirse    Other orders  -     Cancel: Extra Tube; Future  -     Glucose by meter; Standing  -     Glucose by meter  -     Extra Tube; Future  -     Extra Tube         Med Clinician: Gricelda Bunn  Preceptor: Du Jaimes MD     In supervising the medical student, I repeated the exam documented above.  I have reviewed and verified the student s documentation.  Supervising Provider: Du Jaimes MD.

## 2023-09-22 NOTE — PROGRESS NOTES
[facility] Pharm[facility] Progress Note    Chief complaint: Medication refill    Last date of full med: 9/21/23    Subjective:  LENNY is a 57 year old female who presents to the clinic for a medication refill. She was last seen on 6/29/23 and has ran out of her medications. Her medical history includes type 2 diabetes, hypertension, and hyperlipidemia. She has no current medical complaints and has no changes to her health status in the past year. She reports drinking one cup of coffee per day and denies any alcohol, tobacco, or recreational drug use.       Current Outpatient Medications   Medication Sig Dispense Refill     aspirin 81 MG EC tablet Take 1 tablet (81 mg) by mouth daily Yvonne jonas pastilla por boca jonas vez por parish 30 tablet 3     hydrochlorothiazide (HYDRODIURIL) 25 MG tablet Yvonne jonas pastilla por boca jonas vez por parish 30 tablet 3     losartan (COZAAR) 25 MG tablet Yvonne jonas pastilla por boca jonas vez por parish 30 tablet 3     metFORMIN (GLUCOPHAGE) 1000 MG tablet Yvonne jonas pastilla por boca en la manana con comida  Yvonne jonas pastilla por boca en la tarde con comida 60 tablet 3     simvastatin (ZOCOR) 10 MG tablet Yvonne dos pastillas por boca en la noche antes de que dormirse 60 tablet 3     aspirin 81 MG EC tablet Take 1 tablet (81 mg) by mouth daily 30 tablet 4     fish oil-omega-3 fatty acids 1000 MG capsule Take 1 g by mouth daily (Patient not taking: Reported on 9/8/2022)       hydrochlorothiazide (HYDRODIURIL) 25 MG tablet Take 1 tablet (25 mg) by mouth daily for 30 days 30 tablet 5     hydrochlorothiazide (HYDRODIURIL) 25 MG tablet Take 1 tablet (25 mg) by mouth daily 30 tablet 4     IBUPROFEN PO Take 1 tablet by mouth as needed for moderate pain (Takes for shoulder pain but has not needed in a long time.) (Patient not taking: Reported on 9/8/2022)       losartan (COZAAR) 25 MG tablet Take 1 tablet (25 mg) by mouth daily for 30 days 30 tablet 5     losartan (COZAAR) 25 MG tablet Take 1 tablet (25 mg) by mouth daily 30  "tablet 4     metFORMIN (GLUCOPHAGE) 1000 MG tablet Take 1 tablet (1,000 mg) by mouth 2 times daily (with meals) for 30 days 60 tablet 5     metFORMIN (GLUCOPHAGE) 500 MG tablet Take 2 tablets (1,000 mg) by mouth 2 times daily (with meals) 120 tablet 4     simvastatin (ZOCOR) 10 MG tablet Take 2 tablets (20 mg) by mouth At Bedtime for 30 days 60 tablet 5     simvastatin (ZOCOR) 20 MG tablet Take 1 tablet (20 mg) by mouth At Bedtime 30 tablet 4         Objective:  /84 (BP Location: Right arm, Patient Position: Sitting, Cuff Size: Adult Regular)   Pulse 73   Temp (!) 96.2  F (35.7  C) (Temporal)   Resp 16   Ht 1.575 m (5' 2\")   Wt 62.1 kg (136 lb 12.8 oz)   SpO2 98%   BMI 25.02 kg/m          Assessment:  Type 2 Diabetes: controlled   DTP: Needs Additional Therapy  Rationale: The patient is currently controlled on her diabetes medications. She needs additional refills on her medications.     Hypertension: controlled  DTP: Needs Additional Therapy  Rationale: The patient is currently controlled on her antihypertensive medications. She needs additional refills on her medications.    Hyperlipidemia: controlled  DTP: Needs Additional Therapy:  Rationale: The patient is currently controlled on her cholesterol medications. She needs additional refills on her medications. The patient would benefit from a cholesterol panel blood work.       Plan:  1. Refill all medications.  2. Take labs: A1c, BMP, glucose, albuminuria.    Follow-Up:  Follow-up with patient in 3-4 days to evaluate labs. Follow-up in several weeks to assess safety and efficacy of medications.    Pharmacy Clinician: Neli Rizo  Pharmacy Preceptor: Mackenzie Saleh    _____________________________  Preceptor Use Only:  In supervising the student, I have reviewed and verified the student's documentation and found it to be correct and complete.   Preceptor Signature:     "

## 2023-09-22 NOTE — PATIENT INSTRUCTIONS
Patient Visit Summary    Patient Name: Laura Liu  MRN: [id number]    Date of Visit: 9/21/2023    Principle Diagnosis:

## 2023-09-22 NOTE — PHARMACY
[facility] Pharmacy Progress Note    Chief complaint: Medication refill    Last date of full med: 9/21/23    Subjective:  LENNY is a 57 year old female who presents to the clinic today for a medication. She was last seen at [facility] one year ago and was given medications for her diabetes. She presents tonight for a refill of her medications. She has no current medical complaints and no changes to her health status in the past year. Her medical history includes type 2 diabetes, hypertension, and hyperlipidemia. She reports drinking 1 cup of coffee per day, denies tobacco, alcohol, or social drug use.     Current Outpatient Medications   Medication Sig Dispense Refill     aspirin (ASA) 81 MG EC tablet Yvonne jonas pastilla por boca jonas vez por parish 30 tablet 3     aspirin 81 MG EC tablet Take 1 tablet (81 mg) by mouth daily 30 tablet 4     fish oil-omega-3 fatty acids 1000 MG capsule Take 1 g by mouth daily (Patient not taking: Reported on 9/8/2022)       hydrochlorothiazide (HYDRODIURIL) 25 MG tablet Take 1 tablet (25 mg) by mouth daily for 30 days 30 tablet 5     hydrochlorothiazide (HYDRODIURIL) 25 MG tablet Yvonne jonas pastilla por boca jonas vez por parish 30 tablet 3     hydrochlorothiazide (HYDRODIURIL) 25 MG tablet Take 1 tablet (25 mg) by mouth daily 30 tablet 4     IBUPROFEN PO Take 1 tablet by mouth as needed for moderate pain (Takes for shoulder pain but has not needed in a long time.) (Patient not taking: Reported on 9/8/2022)       losartan (COZAAR) 25 MG tablet Take 1 tablet (25 mg) by mouth daily for 30 days 30 tablet 5     losartan (COZAAR) 25 MG tablet Yvonne jonas pastilla por boca jonas vez por parish 30 tablet 3     losartan (COZAAR) 25 MG tablet Take 1 tablet (25 mg) by mouth daily 30 tablet 4     metFORMIN (GLUCOPHAGE) 1000 MG tablet Take 1 tablet (1,000 mg) by mouth 2 times daily (with meals) for 30 days 60 tablet 5     metFORMIN (GLUCOPHAGE) 1000 MG tablet Yvonne jonas pastilla por boca en la manana con comida  Yvonne jonas pastilla por  "boca en la tarde con comida 60 tablet 3     metFORMIN (GLUCOPHAGE) 500 MG tablet Take 2 tablets (1,000 mg) by mouth 2 times daily (with meals) 120 tablet 4     simvastatin (ZOCOR) 10 MG tablet Take 2 tablets (20 mg) by mouth At Bedtime for 30 days 60 tablet 5     simvastatin (ZOCOR) 10 MG tablet Yvonne dos pastillas por boca en la noche antes de que dormirse 60 tablet 3     simvastatin (ZOCOR) 20 MG tablet Take 1 tablet (20 mg) by mouth At Bedtime 30 tablet 4         Objective:  Ht: 5'2\"  Wt: 136.8 lbs  BP: 128/84 mmHg  HR: 73 bpm  RR: 16 bpm  O2: 98%  Temp: 96.2    Assessment:     Type 2 Diabetes: controlled  DTP: Needs Additional Therapy   Rationale: The patient's diabetes is currently well controlled, however, needs updated labs and medication refills.     Hyperlipidemia: controlled  DTP: Needs Additional Therapy:  Rationale: The patients symptoms are well controlled, however, needs updated labs and medication refills.    Hypertension: controlled  DTP: Needs Additional Therapy:  Rationale: The patients symptoms and blood pressure is well controlled, however, she needs medication refills.      Plan:  1. Refill all medications.  2. Take labs: A1c, blood glucose, BPM, microalbuminuria urine.     Follow-Up:  Follow-up with patient in 3-4 days to assess for safety and efficacy of all medications.    Pharmacy Clinician: Neli Rizo  Pharmacy Preceptor: Mackenzie Saleh    _____________________________  Preceptor Use Only:  In supervising the student, I have reviewed and verified the student's documentation and found it to be correct and complete.   Preceptor Signature:   "

## 2023-11-20 ENCOUNTER — OFFICE VISIT (OUTPATIENT)
Dept: FAMILY MEDICINE | Facility: CLINIC | Age: 58
End: 2023-11-20

## 2023-11-20 DIAGNOSIS — E11.65 TYPE 2 DIABETES MELLITUS WITH HYPERGLYCEMIA, WITHOUT LONG-TERM CURRENT USE OF INSULIN (H): Primary | ICD-10-CM

## 2023-11-21 NOTE — PHARMACY-CONSULT NOTE
Quick Fill Counseling Note  Patient Full Name: Laura Liu  : 1965  Last date of full med visit: 2023    Did patient fill within expected time: yes  Number of refill left (after this fill): 1  Next expected fill date: 2023    Student(s) that reviewed medications with patient: Harlan Reddy    Medication 1: Metformin 1000 mg tablet  How is patient taking: by mouth  Effectiveness monitored: unsure, lab values not measured, but patient is adherent  Reported Side effects: none  Any missed doses: none    Medication 2: Losartan 25 mg tablet  How is patient taking: by mouth  Effectiveness monitored: per patient, it works well for her  Reported Side effects: none  Any missed doses: none    Medication 3: Hydrochlorothiazide 25 mg tablet   How is patient taking: by mouth  Effectiveness monitored: per patient, it works well for her  Reported Side effects: none  Any missed doses: none    Medication 4: Aspirin 81 mg tablet   How is patient taking: by mouth  Effectiveness monitored: per patient, it works well for her  Reported Side effects: none  Any missed doses: none    Medication 5: Atorvastatin 20 mg   How is patient taking: by mouth  Effectiveness monitored: per patient, it works well, educated patient on new directions (take 1 tablet by mouth daily)  Reported Side effects: none  Any missed doses: none    Pharmacy Student: Harlan Reddy   Pharmacy Preceptor: Cornelia KO    ________________  Preceptor Use Only:  In supervising the student, I have reviewed and verified the student's documentation and found it to be correct and complete.  Preceptor Signature: Cornelia Escobar RPH, PharmD

## 2025-06-18 NOTE — MR AVS SNAPSHOT
After Visit Summary   2017    Laura Garcia    MRN: [id number]           Patient Information     Date Of Birth          1965        Visit Information        Provider Department      2017 6:45 PM Clinician, Dirk Martinez [facility]        Today's Diagnoses     Encounter for medication refill    -  1       Follow-ups after your visit        Who to contact     Please call your clinic at [phone] to:    Ask questions about your health    Make or cancel appointments    Discuss your medicines    Learn about your test results    Speak to your doctor   If you have compliments or concerns about an experience at your clinic, or if you wish to file a complaint, please contact [facility] Patient Relations at [phone] or email us at [email or web address]         Additional Information About Your Visit        MyChart Information     boosk is an electronic gateway that provides easy, online access to your medical records. With boosk, you can request a clinic appointment, read your test results, renew a prescription or communicate with your care team.     To sign up for [employer] visit the website at [email or web address]   You will be asked to enter the access code listed below, as well as some personal information. Please follow the directions to create your username and password.     Your access code is: [email or web address]  Expires: 2017  3:30 PM     Your access code will  in 90 days. If you need help or a new code, please contact your [facility] or call [phone] for assistance.        Care EveryWhere ID     This is your Care EveryWhere ID. This could be used by other organizations to access your [place] medical records  [id number]         Blood Pressure from Last 3 Encounters:   17 118/81   16 125/90   16 120/80    Weight from Last 3 Encounters:   17 143 lb  12.8 oz (65.2 kg)   12/19/16 143 lb 8 oz (65.1 kg)   06/16/16 141 lb 6.4 oz (64.1 kg)              Today, you had the following     No orders found for display       Primary Care Provider    None       No address on file        Equal Access to Services     LM NAVARRO : Hadii charles ku hadfantasmao Soomaali, waaxda luqadaha, qaybta kaalmada adeegyada, courtney shi gailmanuel arthur robert amado hardin. So [facility] [phone].    ATENCIÓN: Si habla español, tiene a ocampo disposición servicios gratuitos de asistencia lingüística. Llame al [phone].    We comply with applicable federal civil rights laws and [place] laws. We do not discriminate on the basis of race, color, national origin, age, disability sex, sexual orientation or gender identity.            Thank you!     Thank you for choosing [facility]  for your care. Our goal is always to provide you with excellent care. Hearing back from our patients is one way we can continue to improve our services. Please take a few minutes to complete the written survey that you may receive in the mail after your visit with us. Thank you!             Your Updated Medication List - Protect others around you: Learn how to safely use, store and throw away your medicines at www.disposemymeds.org.          This list is accurate as of: 6/12/17 11:59 PM.  Always use your most recent med list.                   Brand Name Dispense Instructions for use Diagnosis    aspirin 81 MG EC tablet     30 tablet    Take 1 tablet (81 mg) by mouth daily    Essential hypertension with goal blood pressure less than 140/90, Type 2 diabetes mellitus without complication, without long-term current use of insulin (H)       fish oil-omega-3 fatty acids 1000 MG capsule      Take 1 g by mouth daily        hydrochlorothiazide 25 MG tablet    HYDRODIURIL    30 tablet    Take 1 tablet (25 mg) by mouth daily    Essential hypertension with goal blood pressure less than 140/90       IBUPROFEN PO      Take 1  tablet by mouth as needed for moderate pain (Takes for shoulder pain but has not needed in a long time.)        losartan 25 MG tablet    COZAAR    30 tablet    Take 1 tablet (25 mg) by mouth daily    Essential hypertension with goal blood pressure less than 140/90       metFORMIN 500 MG tablet    GLUCOPHAGE    120 tablet    Take 2 tablets (1,000 mg) by mouth 2 times daily (with meals)    Type 2 diabetes mellitus without complication, without long-term current use of insulin (H)       simvastatin 20 MG tablet    ZOCOR    30 tablet    Take 1 tablet (20 mg) by mouth At Bedtime    Type 2 diabetes mellitus without complication, without long-term current use of insulin (H)          yes